# Patient Record
Sex: FEMALE | Race: BLACK OR AFRICAN AMERICAN | URBAN - METROPOLITAN AREA
[De-identification: names, ages, dates, MRNs, and addresses within clinical notes are randomized per-mention and may not be internally consistent; named-entity substitution may affect disease eponyms.]

---

## 2017-05-30 ENCOUNTER — TELEPHONE (OUTPATIENT)
Dept: OTOLARYNGOLOGY | Facility: CLINIC | Age: 52
End: 2017-05-30

## 2017-05-30 NOTE — TELEPHONE ENCOUNTER
Per  pt informed that VNG normal, Pt to RTC with audiogram. Pt informed of results, follow up appointment scheduled and pt will bring copy of audio report to f/u appointment.

## 2017-06-02 ENCOUNTER — OFFICE VISIT (OUTPATIENT)
Dept: OTOLARYNGOLOGY | Facility: CLINIC | Age: 52
End: 2017-06-02

## 2017-06-02 VITALS
DIASTOLIC BLOOD PRESSURE: 76 MMHG | HEIGHT: 60 IN | TEMPERATURE: 98 F | BODY MASS INDEX: 31.22 KG/M2 | SYSTOLIC BLOOD PRESSURE: 116 MMHG | WEIGHT: 159 LBS

## 2017-06-02 DIAGNOSIS — R42 DIZZINESS: Primary | ICD-10-CM

## 2017-06-02 PROCEDURE — 99214 OFFICE O/P EST MOD 30 MIN: CPT | Performed by: OTOLARYNGOLOGY

## 2017-06-02 PROCEDURE — 99212 OFFICE O/P EST SF 10 MIN: CPT | Performed by: OTOLARYNGOLOGY

## 2017-06-02 RX ORDER — SERTRALINE HYDROCHLORIDE 100 MG/1
TABLET, FILM COATED ORAL
Refills: 0 | COMMUNITY
Start: 2017-03-21

## 2017-06-02 RX ORDER — GRISEOFULVIN 500 MG/1
TABLET ORAL
Refills: 0 | COMMUNITY
Start: 2017-03-23

## 2017-06-02 NOTE — PROGRESS NOTES
Mary Robison is a 50year old female. Patient presents with:  Results: VNG done on 5-19      HISTORY OF PRESENT ILLNESS    She presents with a history of recurrent episodes of imbalance.  This started several years ago and responds to meclizine which she us Negative Cold intolerance and heat intolerance. Neuro Negative Tremors. Psych Negative Anxiety and depression. Integumentary Negative Frequent skin infections, pigment change and rash. Hema/Lymph Negative Easy bleeding and easy bruising. Disp: , Rfl: 0  ASSESSMENT AND PLAN    1. Dizziness  Reviewed balance study. Right sided weakness of 15% may not be relevant. Dizziness she feels at home is essentially the same as when she had calorics performed.   Normal audiogram. Recommend vestibular t

## (undated) NOTE — MR AVS SNAPSHOT
[facility]  [address], [address]  [phone]               Thank you for choosing us for your health care visit with Herman Solis.  Anmol Dunbar MD.  We are glad to serve you and happy to provide you with this summary Reason for Today's Visit     Results           Medical Issues Discussed Today     Dizziness    -  Primary      Instructions and Information about Your Health     None      Allergies as of Jun 02, 2017     Not on File                Today's Vital Signs